# Patient Record
Sex: FEMALE | Race: BLACK OR AFRICAN AMERICAN | NOT HISPANIC OR LATINO | Employment: OTHER | URBAN - METROPOLITAN AREA
[De-identification: names, ages, dates, MRNs, and addresses within clinical notes are randomized per-mention and may not be internally consistent; named-entity substitution may affect disease eponyms.]

---

## 2018-11-29 ENCOUNTER — HOSPITAL ENCOUNTER (EMERGENCY)
Facility: HOSPITAL | Age: 73
Discharge: HOME | End: 2018-11-29
Attending: EMERGENCY MEDICINE | Admitting: EMERGENCY MEDICINE
Payer: MEDICARE

## 2018-11-29 VITALS
TEMPERATURE: 97.9 F | OXYGEN SATURATION: 99 % | BODY MASS INDEX: 24.95 KG/M2 | SYSTOLIC BLOOD PRESSURE: 110 MMHG | HEART RATE: 59 BPM | DIASTOLIC BLOOD PRESSURE: 56 MMHG | WEIGHT: 135.58 LBS | HEIGHT: 62 IN | RESPIRATION RATE: 18 BRPM

## 2018-11-29 DIAGNOSIS — R73.9 HYPERGLYCEMIA: Primary | ICD-10-CM

## 2018-11-29 DIAGNOSIS — N39.0 UTI (URINARY TRACT INFECTION): ICD-10-CM

## 2018-11-29 DIAGNOSIS — E86.0 DEHYDRATION: ICD-10-CM

## 2018-11-29 LAB
ACETONE SERPL-MCNC: NEGATIVE MG/DL
ALBUMIN SERPL BCP-MCNC: 2.9 G/DL (ref 3.5–5)
ALP SERPL-CCNC: 119 U/L (ref 46–116)
ALT SERPL W P-5'-P-CCNC: 19 U/L (ref 12–78)
ANION GAP SERPL CALCULATED.3IONS-SCNC: 10 MMOL/L (ref 4–13)
ANION GAP SERPL CALCULATED.3IONS-SCNC: 9 MMOL/L (ref 4–13)
AST SERPL W P-5'-P-CCNC: 15 U/L (ref 5–45)
BACTERIA UR QL AUTO: ABNORMAL /HPF
BASE EX.OXY STD BLDV CALC-SCNC: 32.3 % (ref 60–80)
BASE EXCESS BLDV CALC-SCNC: -1.4 MMOL/L
BASOPHILS # BLD AUTO: 0.01 THOUSANDS/ΜL (ref 0–0.1)
BASOPHILS NFR BLD AUTO: 0 % (ref 0–1)
BILIRUB SERPL-MCNC: 0.4 MG/DL (ref 0.2–1)
BILIRUB UR QL STRIP: NEGATIVE
BUN SERPL-MCNC: 25 MG/DL (ref 5–25)
BUN SERPL-MCNC: 31 MG/DL (ref 5–25)
CALCIUM SERPL-MCNC: 8.2 MG/DL (ref 8.3–10.1)
CALCIUM SERPL-MCNC: 8.9 MG/DL (ref 8.3–10.1)
CHLORIDE SERPL-SCNC: 102 MMOL/L (ref 100–108)
CHLORIDE SERPL-SCNC: 91 MMOL/L (ref 100–108)
CLARITY UR: ABNORMAL
CO2 SERPL-SCNC: 26 MMOL/L (ref 21–32)
CO2 SERPL-SCNC: 26 MMOL/L (ref 21–32)
COLOR UR: YELLOW
CREAT SERPL-MCNC: 1.5 MG/DL (ref 0.6–1.3)
CREAT SERPL-MCNC: 1.82 MG/DL (ref 0.6–1.3)
EOSINOPHIL # BLD AUTO: 0.01 THOUSAND/ΜL (ref 0–0.61)
EOSINOPHIL NFR BLD AUTO: 0 % (ref 0–6)
ERYTHROCYTE [DISTWIDTH] IN BLOOD BY AUTOMATED COUNT: 11.9 % (ref 11.6–15.1)
GFR SERPL CREATININE-BSD FRML MDRD: 32 ML/MIN/1.73SQ M
GFR SERPL CREATININE-BSD FRML MDRD: 40 ML/MIN/1.73SQ M
GLUCOSE SERPL-MCNC: 345 MG/DL (ref 65–140)
GLUCOSE SERPL-MCNC: 684 MG/DL (ref 65–140)
GLUCOSE UR STRIP-MCNC: ABNORMAL MG/DL
HCO3 BLDV-SCNC: 24.9 MMOL/L (ref 24–30)
HCT VFR BLD AUTO: 32.2 % (ref 34.8–46.1)
HGB BLD-MCNC: 11.1 G/DL (ref 11.5–15.4)
HGB UR QL STRIP.AUTO: ABNORMAL
IMM GRANULOCYTES # BLD AUTO: 0.02 THOUSAND/UL (ref 0–0.2)
IMM GRANULOCYTES NFR BLD AUTO: 0 % (ref 0–2)
KETONES UR STRIP-MCNC: ABNORMAL MG/DL
LEUKOCYTE ESTERASE UR QL STRIP: ABNORMAL
LYMPHOCYTES # BLD AUTO: 2 THOUSANDS/ΜL (ref 0.6–4.47)
LYMPHOCYTES NFR BLD AUTO: 23 % (ref 14–44)
MCH RBC QN AUTO: 29.4 PG (ref 26.8–34.3)
MCHC RBC AUTO-ENTMCNC: 34.5 G/DL (ref 31.4–37.4)
MCV RBC AUTO: 85 FL (ref 82–98)
MONOCYTES # BLD AUTO: 0.66 THOUSAND/ΜL (ref 0.17–1.22)
MONOCYTES NFR BLD AUTO: 8 % (ref 4–12)
NEUTROPHILS # BLD AUTO: 5.83 THOUSANDS/ΜL (ref 1.85–7.62)
NEUTS SEG NFR BLD AUTO: 69 % (ref 43–75)
NITRITE UR QL STRIP: NEGATIVE
NON-SQ EPI CELLS URNS QL MICRO: ABNORMAL /HPF
NRBC BLD AUTO-RTO: 0 /100 WBCS
O2 CT BLDV-SCNC: 5.2 ML/DL
PCO2 BLDV: 48.7 MM HG (ref 42–50)
PH BLDV: 7.33 [PH] (ref 7.3–7.4)
PH UR STRIP.AUTO: 5.5 [PH] (ref 4.5–8)
PLATELET # BLD AUTO: 254 THOUSANDS/UL (ref 149–390)
PMV BLD AUTO: 9.9 FL (ref 8.9–12.7)
PO2 BLDV: 19.4 MM HG (ref 35–45)
POTASSIUM SERPL-SCNC: 3.7 MMOL/L (ref 3.5–5.3)
POTASSIUM SERPL-SCNC: 4.8 MMOL/L (ref 3.5–5.3)
PROT SERPL-MCNC: 7.4 G/DL (ref 6.4–8.2)
PROT UR STRIP-MCNC: NEGATIVE MG/DL
RBC # BLD AUTO: 3.77 MILLION/UL (ref 3.81–5.12)
RBC #/AREA URNS AUTO: ABNORMAL /HPF
SODIUM SERPL-SCNC: 127 MMOL/L (ref 136–145)
SODIUM SERPL-SCNC: 137 MMOL/L (ref 136–145)
SP GR UR STRIP.AUTO: <=1.005 (ref 1–1.03)
TSH SERPL DL<=0.05 MIU/L-ACNC: 0.35 UIU/ML (ref 0.36–3.74)
UROBILINOGEN UR QL STRIP.AUTO: 0.2 E.U./DL
WBC # BLD AUTO: 8.53 THOUSAND/UL (ref 4.31–10.16)
WBC #/AREA URNS AUTO: ABNORMAL /HPF
WBC CLUMPS # UR AUTO: ABNORMAL /UL

## 2018-11-29 PROCEDURE — 96361 HYDRATE IV INFUSION ADD-ON: CPT

## 2018-11-29 PROCEDURE — 84443 ASSAY THYROID STIM HORMONE: CPT | Performed by: EMERGENCY MEDICINE

## 2018-11-29 PROCEDURE — 82805 BLOOD GASES W/O2 SATURATION: CPT | Performed by: EMERGENCY MEDICINE

## 2018-11-29 PROCEDURE — 96372 THER/PROPH/DIAG INJ SC/IM: CPT

## 2018-11-29 PROCEDURE — 80048 BASIC METABOLIC PNL TOTAL CA: CPT | Performed by: EMERGENCY MEDICINE

## 2018-11-29 PROCEDURE — 96360 HYDRATION IV INFUSION INIT: CPT

## 2018-11-29 PROCEDURE — 36415 COLL VENOUS BLD VENIPUNCTURE: CPT | Performed by: EMERGENCY MEDICINE

## 2018-11-29 PROCEDURE — 85025 COMPLETE CBC W/AUTO DIFF WBC: CPT | Performed by: EMERGENCY MEDICINE

## 2018-11-29 PROCEDURE — 99284 EMERGENCY DEPT VISIT MOD MDM: CPT

## 2018-11-29 PROCEDURE — 81001 URINALYSIS AUTO W/SCOPE: CPT | Performed by: EMERGENCY MEDICINE

## 2018-11-29 PROCEDURE — 82009 KETONE BODYS QUAL: CPT | Performed by: EMERGENCY MEDICINE

## 2018-11-29 PROCEDURE — 80053 COMPREHEN METABOLIC PANEL: CPT | Performed by: EMERGENCY MEDICINE

## 2018-11-29 RX ORDER — CEPHALEXIN 500 MG/1
500 CAPSULE ORAL EVERY 8 HOURS SCHEDULED
Qty: 21 CAPSULE | Refills: 0 | Status: SHIPPED | OUTPATIENT
Start: 2018-11-29 | End: 2018-12-06

## 2018-11-29 RX ORDER — CEPHALEXIN 250 MG/1
500 CAPSULE ORAL ONCE
Status: COMPLETED | OUTPATIENT
Start: 2018-11-29 | End: 2018-11-29

## 2018-11-29 RX ADMIN — CEPHALEXIN 500 MG: 250 CAPSULE ORAL at 13:28

## 2018-11-29 RX ADMIN — SODIUM CHLORIDE 1000 ML: 0.9 INJECTION, SOLUTION INTRAVENOUS at 10:14

## 2018-11-29 RX ADMIN — INSULIN HUMAN 15 UNITS: 100 INJECTION, SOLUTION PARENTERAL at 10:14

## 2018-11-29 RX ADMIN — SODIUM CHLORIDE 1000 ML: 0.9 INJECTION, SOLUTION INTRAVENOUS at 11:42

## 2018-11-29 RX ADMIN — SODIUM CHLORIDE 500 ML: 0.9 INJECTION, SOLUTION INTRAVENOUS at 09:19

## 2018-11-29 NOTE — ED PROVIDER NOTES
History  Chief Complaint   Patient presents with    Weight Loss     pt with unexplained weight loss     Pt  Lost 5 or 6 lbs  In a month and she's worried about it  She has had decreased appetite  Denies any cp, sob, abd  Pain, headaches, n/v/d  Pt  Denies any problems with her thyroid  H/o type 2 diabetes - on insulin  Taking meds regularly  None       No past medical history on file  No past surgical history on file  No family history on file  I have reviewed and agree with the history as documented  Social History   Substance Use Topics    Smoking status: Not on file    Smokeless tobacco: Not on file    Alcohol use Not on file        Review of Systems   Constitutional: Positive for appetite change and fatigue  Negative for fever  HENT: Negative for rhinorrhea and sore throat  Respiratory: Negative for cough, shortness of breath and wheezing  Cardiovascular: Negative for chest pain and leg swelling  Gastrointestinal: Negative for abdominal pain, diarrhea and vomiting  Genitourinary: Negative for dysuria and flank pain  Musculoskeletal: Negative for back pain and neck pain  Skin: Negative for rash  Neurological: Positive for weakness  Negative for syncope and headaches  Psychiatric/Behavioral:        Mood normal       Physical Exam  Physical Exam   Constitutional: She is oriented to person, place, and time  She appears well-developed and well-nourished  HENT:   Head: Normocephalic and atraumatic  Mildly dry MM   Neck: Normal range of motion  Neck supple  Cardiovascular: Normal rate and regular rhythm  Pulmonary/Chest: Effort normal and breath sounds normal  No respiratory distress  Abdominal: Soft  There is no tenderness  Musculoskeletal: Normal range of motion  Neurological: She is alert and oriented to person, place, and time  Skin: Skin is warm and dry  Nursing note and vitals reviewed        Vital Signs  ED Triage Vitals [11/29/18 0825] Temperature Pulse Respirations Blood Pressure SpO2   97 9 °F (36 6 °C) 98 16 148/65 99 %      Temp Source Heart Rate Source Patient Position - Orthostatic VS BP Location FiO2 (%)   Oral Monitor Sitting Right arm --      Pain Score       No Pain           Vitals:    11/29/18 0825 11/29/18 1023 11/29/18 1245   BP: 148/65 155/60 110/56   Pulse: 98 78 59   Patient Position - Orthostatic VS: Sitting         Visual Acuity      ED Medications  Medications   sodium chloride 0 9 % bolus 500 mL (0 mL Intravenous Stopped 11/29/18 1019)   insulin regular (HumuLIN R,NovoLIN R) injection 15 Units (15 Units Subcutaneous Given 11/29/18 1014)   sodium chloride 0 9 % bolus 1,000 mL (0 mL Intravenous Stopped 11/29/18 1114)   sodium chloride 0 9 % bolus 1,000 mL (0 mL Intravenous Stopped 11/29/18 1242)   cephalexin (KEFLEX) capsule 500 mg (500 mg Oral Given 11/29/18 1328)       Diagnostic Studies  Results Reviewed     Procedure Component Value Units Date/Time    Basic metabolic panel [[id number]]  (Abnormal) Collected:  11/29/18 1254    Lab Status:  Final result Specimen:  Blood from Arm, Right Updated:  11/29/18 1315     Sodium 137 mmol/L      Potassium 3 7 mmol/L      Chloride 102 mmol/L      CO2 26 mmol/L      ANION GAP 9 mmol/L      BUN 25 mg/dL      Creatinine 1 50 (H) mg/dL      Glucose 345 (H) mg/dL      Calcium 8 2 (L) mg/dL      eGFR 40 ml/min/1 73sq m     Narrative:         National Kidney Disease Education Program recommendations are as follows:  GFR calculation is accurate only with a steady state creatinine  Chronic Kidney disease less than 60 ml/min/1 73 sq  meters  Kidney failure less than 15 ml/min/1 73 sq  meters      Urine Microscopic [[id number]]  (Abnormal) Collected:  11/29/18 1014    Lab Status:  Final result Specimen:  Urine from Urine, Clean Catch Updated:  11/29/18 1045     RBC, UA 1-2 (A) /hpf      WBC, UA 20-30 (A) /hpf      Epithelial Cells Occasional /hpf      Bacteria, UA Moderate (A) /hpf      WBC Clumps few present    Acetone [[id number]]  (Normal) Collected:  11/29/18 1014    Lab Status:  Final result Specimen:  Blood from Arm, Right Updated:  11/29/18 1038     Acetone, Bld Negative    UA w Reflex to Microscopic [[id number]]  (Abnormal) Collected:  11/29/18 1014    Lab Status:  Final result Specimen:  Urine from Urine, Clean Catch Updated:  11/29/18 1027     Color, UA Yellow     Clarity, UA Slightly Cloudy     Specific Gravity, UA <=1 005     pH, UA 5 5     Leukocytes, UA Elevated glucose may cause decreased leukocyte values  See urine microscopic for [facility] result/ (A)     Nitrite, UA Negative     Protein, UA Negative mg/dl      Glucose, UA >=1000 (1%) (A) mg/dl      Ketones, UA Trace (A) mg/dl      Urobilinogen, UA 0 2 E U /dl      Bilirubin, UA Negative     Blood, UA Trace-Intact (A)    Blood gas, venous [[id number]]  (Abnormal) Collected:  11/29/18 1014    Lab Status:  Final result Specimen:  Blood from Arm, Right Updated:  11/29/18 1025     pH, Jorge Luis 7 326     pCO2, Jorge Luis 48 7 mm Hg      pO2, Jorge Luis 19 4 (L) mm Hg      HCO3, Jorge Luis 24 9 mmol/L      Base Excess, Jorge Luis -1 4 mmol/L      O2 Content, Jorge Luis 5 2 ml/dL      O2 HGB, VENOUS 32 3 (L) %     Comprehensive metabolic panel [[id number]]  (Abnormal) Collected:  11/29/18 0919    Lab Status:  Final result Specimen:  Blood from Arm, Right Updated:  11/29/18 0959     Sodium 127 (L) mmol/L      Potassium 4 8 mmol/L      Chloride 91 (L) mmol/L      CO2 26 mmol/L      ANION GAP 10 mmol/L      BUN 31 (H) mg/dL      Creatinine 1 82 (H) mg/dL      Glucose 684 (HH) mg/dL      Calcium 8 9 mg/dL      AST 15 U/L      ALT 19 U/L      Alkaline Phosphatase 119 (H) U/L      Total Protein 7 4 g/dL      Albumin 2 9 (L) g/dL      Total Bilirubin 0 40 mg/dL      eGFR 32 ml/min/1 73sq m     Narrative:         National Kidney Disease Education Program recommendations are as follows:  GFR calculation is accurate only with a steady state creatinine  Chronic Kidney disease less than 60 ml/min/1 73 sq  meters  Kidney failure less than 15 ml/min/1 73 sq  meters  TSH [[id number]]  (Abnormal) Collected:  11/29/18 0919    Lab Status:  Final result Specimen:  Blood from Arm, Right Updated:  11/29/18 0955     TSH 3RD GENERATON 0 351 (L) uIU/mL     Narrative:         Patients undergoing fluorescein dye angiography may retain small amounts of fluorescein in the body for 48-72 hours post procedure  Samples containing fluorescein can produce falsely depressed TSH values  If the patient had this procedure,a specimen should be resubmitted post fluorescein clearance            The recommended reference ranges for TSH during pregnancy are as follows:  First trimester 0 1 to 2 5 uIU/mL  Second trimester  0 2 to 3 0 uIU/mL  Third trimester 0 3 to 3 0 uIU/m      CBC and differential [[id number]]  (Abnormal) Collected:  11/29/18 0919    Lab Status:  Final result Specimen:  Blood from Arm, Right Updated:  11/29/18 0926     WBC 8 53 Thousand/uL      RBC 3 77 (L) Million/uL      Hemoglobin 11 1 (L) g/dL      Hematocrit 32 2 (L) %      MCV 85 fL      MCH 29 4 pg      MCHC 34 5 g/dL      RDW 11 9 %      MPV 9 9 fL      Platelets 373 Thousands/uL      nRBC 0 /100 WBCs      Neutrophils Relative 69 %      Immat GRANS % 0 %      Lymphocytes Relative 23 %      Monocytes Relative 8 %      Eosinophils Relative 0 %      Basophils Relative 0 %      Neutrophils Absolute 5 83 Thousands/µL      Immature Grans Absolute 0 02 Thousand/uL      Lymphocytes Absolute 2 00 Thousands/µL      Monocytes Absolute 0 66 Thousand/µL      Eosinophils Absolute 0 01 Thousand/µL      Basophils Absolute 0 01 Thousands/µL                  No orders to display              Procedures  Procedures       Phone Contacts  ED Phone Contact    ED Course           Identification of Seniors at Risk      Most Recent Value   (ISAR) Identification of Seniors at Risk   Before the illness or injury that brought you to the Emergency, did you need someone to help you on a regular basis?  0 Filed at: 11/29/2018 0825   In the last 24 hours, have you needed more help than usual?  0 Filed at: 11/29/2018 0825   Have you been hospitalized for one or more nights during the past 6 months? 0 Filed at: 11/29/2018 0825   In general, do you see well?  0 Filed at: 11/29/2018 0825   In general, do you have serious problems with your memory? 0 Filed at: 11/29/2018 0825   Do you take more than three different medications every day? 1 Filed at: 11/29/2018 0825   ISAR Score  1 Filed at: 11/29/2018 0825                          MDM  Number of Diagnoses or Management Options  Dehydration:   Hyperglycemia:   UTI (urinary tract infection):      Amount and/or Complexity of Data Reviewed  Clinical lab tests: ordered and reviewed  Tests in the radiology section of CPT®: ordered and reviewed    Risk of Complications, Morbidity, and/or Mortality  Presenting problems: moderate  General comments: Pt  Refused admission  We gave her 2L of iv fluids here and insulin, labs were more normal, blood sugar lowered and pt  Felt better and she wanted to go home  She understands to return for any worsening of symptoms and see her family dr  For follow up      CritCare Time    Disposition  Final diagnoses:   Hyperglycemia   Dehydration   UTI (urinary tract infection)     Time reflects when diagnosis was documented in both MDM as applicable and the Disposition within this note     Time User Action Codes Description Comment    11/29/2018  1:21 PM Angie Us Add [R73 9] Hyperglycemia     11/29/2018  1:21 PM Angie Us Add [E86 0] Dehydration     11/29/2018  1:21 PM Angie Us Add [N39 0] UTI (urinary tract infection)       ED Disposition     ED Disposition Condition Comment    Discharge  Alex Blase discharge to home/self care      Condition at discharge: Stable        Follow-up Information     Follow up With Specialties Details Why [address] Nurse Practitioner   [address] [address]  [phone]            Discharge Medication List as of 11/29/2018  1:22 PM      START taking these medications    Details   cephalexin (KEFLEX) 500 mg capsule Take 1 capsule (500 mg total) by mouth every 8 (eight) hours for 7 days, Starting Thu 11/29/2018, Until Thu 12/6/2018, Print           No discharge procedures on file      ED Provider  Electronically Signed by           Vandana Bergeron MD  12/02/18 9149

## 2018-11-29 NOTE — ED NOTES
Patient given snacks and water  Patient awaiting son to come pick her up        Andres Machado RN  11/29/18 4920

## 2018-11-29 NOTE — DISCHARGE INSTRUCTIONS
Dysuria   WHAT YOU NEED TO KNOW:   Dysuria is difficulty urinating, or pain, burning, or discomfort with urination  Dysuria is usually a symptom of another problem  DISCHARGE INSTRUCTIONS:   Return to the emergency department if:   · You have severe back, side, or abdominal pain  · You have fever and shaking chills  · You vomit several times in a row  Contact your healthcare provider if:   · Your symptoms do not go away, even after treatment  · You have questions or concerns about your condition or care  Medicines:   · Medicines  may be given to help treat a bacterial infection or help decrease bladder spasms  · Take your medicine as directed  Contact your healthcare provider if you think your medicine is not helping or if you have side effects  Tell him of her if you are allergic to any medicine  Keep a list of the medicines, vitamins, and herbs you take  Include the amounts, and when and why you take them  Bring the list or the pill bottles to follow-up visits  Carry your medicine list with you in case of an emergency  Follow up with your healthcare provider as directed: Your healthcare provider may also refer you to a urologist or nephrologist to have additional testing  Write down your questions so you remember to ask them during your visits  Manage your dysuria:   · Drink more liquids  Liquids help flush out bacteria that may be causing an infection  Ask your healthcare provider how much liquid to drink each day and which liquids are best for you  · Take sitz baths as directed  Fill a bathtub with 4 to 6 inches of warm water  You may also use a sitz bath pan that fits over a toilet  Sit in the sitz bath for 20 minutes  Do this 2 to 3 times a day, or as directed  The warm water can help decrease pain and swelling  © 2017 [employer] Information is for End User's use only and may not be sold, redistributed or otherwise used for commercial purposes   All illustrations and images included in CareNotes® are the copyrighted property of [employer]  or Patricio Dougherty  The above information is an  only  It is not intended as medical advice for individual conditions or treatments  Talk to your doctor, nurse or pharmacist before following any medical regimen to see if it is safe and effective for you  Dehydration   WHAT YOU NEED TO KNOW:   Dehydration is a condition that develops when your body does not have enough fluid  You may become dehydrated if you do not drink enough water or lose too much fluid  Fluid loss may also cause loss of electrolytes (minerals), such as sodium  DISCHARGE INSTRUCTIONS:   Return to the emergency department if:   · You have a seizure  · You are confused or cannot think clearly  · You are extremely sleepy, or another person cannot wake you  · You become dizzy or faint when you stand  · You are not able to urinate  · You have trouble breathing  · You have a fast or irregular heartbeat  · Your hands or feet are cold, or your face is pale  Contact your healthcare provider if:   · You have trouble drinking liquids because you are vomiting  · Your symptoms get worse  · You have a fever  · You feel very weak or tired  · You have questions or concerns about your condition or care  Follow up with your healthcare provider as directed:  Write down your questions so you remember to ask them during your visits  Prevent or manage dehydration:   · Drink liquids as directed  Liquids that contain water, sugar, and minerals can help your body hold in fluid and help prevent dehydration  Drink liquids throughout the day, not just when you feel thirsty  Men should drink about 3 liters (13 eight-ounce cups) of liquid each day  Women should drink about 2 liters (9 eight-ounce cups) of liquid each day  Drink even more liquid if you will be outdoors, in the sun for a long time, or exercising  · Stay cool  Limit the time you spend outdoors during the hottest part of the day  Dress in lightweight clothes  · Keep track of how often you urinate  If you urinate less than usual or your urine is darker, drink more liquids  © 2017 [address] [employer] Information is for End User's use only and may not be sold, redistributed or otherwise used for commercial purposes  All illustrations and images included in CareNotes® are the copyrighted property of [employer] M [employer]  or Patricio Dougherty  The above information is an  only  It is not intended as medical advice for individual conditions or treatments  Talk to your doctor, nurse or pharmacist before following any medical regimen to see if it is safe and effective for you  Diabetic Hyperglycemia   WHAT YOU NEED TO KNOW:   Diabetic hyperglycemia is a blood glucose (sugar) level that is higher than your healthcare provider recommends  You may have increased thirst and urinate more often than usual  Over time, uncontrolled diabetes can damage your nerves, blood vessels, tissues, and organs  That is why it is important to manage diabetic hyperglycemia  Without treatment, diabetic hyperglycemia can lead to diabetic ketoacidosis (DKA) or hyperglycemic hyperosmolar state (HHS)  These are serious conditions that can become life-threatening  DISCHARGE INSTRUCTIONS:   Return to the emergency department if:   · You have shortness of breath  · Your breath smells fruity  · You have nausea and vomiting  · You have symptoms of dehydration, such as dark yellow urine, dry mouth and lips, and dry skin  Contact your healthcare provider if:   · You continue to have higher blood sugar levels than your healthcare provider recommends  · Your blood sugar level is over 240 mg/dl and  you have ketones in your urine  · You have questions or concerns about your condition or care    Medicines:   · Medicines  such as insulin and hypoglycemic medicine decrease blood sugar levels  · Take your medicine as directed  Contact your healthcare provider if you think your medicine is not helping or if you have side effects  Tell him or her if you are allergic to any medicine  Keep a list of the medicines, vitamins, and herbs you take  Include the amounts, and when and why you take them  Bring the list or the pill bottles to follow-up visits  Carry your medicine list with you in case of an emergency  Follow up with your healthcare provider or specialist as directed: Your healthcare provider may refer you to a dietitian or diabetes specialist  Write down your questions so you remember to ask them during your visits  Manage diabetic hyperglycemia:   · If you take diabetes medicine or insulin, take it as directed  Missed or wrong doses can cause your blood sugar to go up  · Tell your healthcare provider if you continue to have trouble managing your blood sugar  He may change the type, amount, or timing of your diabetes medicine or insulin  If you do not take diabetes medicine or insulin, you may need to start  · Work with your healthcare provider to develop a sick day plan  Illness can cause your blood sugar to rise  A sick day plan helps you control your blood sugar level when you are sick  Prevent diabetic hyperglycemia:   · Check your blood sugar levels regularly  Ask your healthcare provider how often to check your blood sugar and what your levels should be  · Follow your meal plan  Your blood sugar can go up if you eat a large meal or you eat more carbohydrates than recommended  Work with a dietitian to develop a meal plan that is right for you  · Exercise regularly  to help lower your blood sugar when it is high  It can also keep your blood sugar levels steady over time  Exercise for at least 30 minutes, 5 days a week  Include muscle strengthening activities 2 days each week  Do not sit for longer than 90 minutes at a time   Work with your healthcare provider to create an exercise plan  Children should get at least 60 minutes of physical activity each day  · Check your ketones before exercise  if your blood sugar level is above 240 mg/dl  Do not exercise if you have ketones in your urine,  because your blood sugar level may rise even more  Ask your healthcare provider how to lower your blood sugar when you have ketones  © 2017 [address] [employer] Information is for End User's use only and may not be sold, redistributed or otherwise used for commercial purposes  All illustrations and images included in CareNotes® are the copyrighted property of [employer]  or Patricio Dougherty  The above information is an  only  It is not intended as medical advice for individual conditions or treatments  Talk to your doctor, nurse or pharmacist before following any medical regimen to see if it is safe and effective for you